# Patient Record
Sex: FEMALE | Race: ASIAN | NOT HISPANIC OR LATINO
[De-identification: names, ages, dates, MRNs, and addresses within clinical notes are randomized per-mention and may not be internally consistent; named-entity substitution may affect disease eponyms.]

---

## 2024-09-05 ENCOUNTER — APPOINTMENT (OUTPATIENT)
Dept: OTOLARYNGOLOGY | Facility: CLINIC | Age: 49
End: 2024-09-05
Payer: COMMERCIAL

## 2024-09-05 DIAGNOSIS — K21.9 GASTRO-ESOPHAGEAL REFLUX DISEASE W/OUT ESOPHAGITIS: ICD-10-CM

## 2024-09-05 DIAGNOSIS — J34.2 DEVIATED NASAL SEPTUM: ICD-10-CM

## 2024-09-05 DIAGNOSIS — H69.90 UNSPECIFIED EUSTACHIAN TUBE DISORDER, UNSPECIFIED EAR: ICD-10-CM

## 2024-09-05 PROBLEM — Z00.00 ENCOUNTER FOR PREVENTIVE HEALTH EXAMINATION: Status: ACTIVE | Noted: 2024-09-05

## 2024-09-05 PROCEDURE — 99204 OFFICE O/P NEW MOD 45 MIN: CPT | Mod: 25

## 2024-09-05 PROCEDURE — 31231 NASAL ENDOSCOPY DX: CPT

## 2024-09-05 RX ORDER — OMEPRAZOLE 20 MG/1
20 TABLET, DELAYED RELEASE ORAL
Qty: 90 | Refills: 2 | Status: ACTIVE | COMMUNITY
Start: 2024-09-05 | End: 1900-01-01

## 2024-09-05 RX ORDER — BUDESONIDE 0.5 MG/2ML
0.5 INHALANT ORAL TWICE DAILY
Qty: 3 | Refills: 2 | Status: ACTIVE | COMMUNITY
Start: 2024-09-05 | End: 1900-01-01

## 2024-09-05 NOTE — PHYSICAL EXAM
[de-identified] : retraction L TM [Nasal Endoscopy Performed] : nasal endoscopy was performed, see procedure section for findings [Normal] : teeth are normal [de-identified] : surgically absent

## 2024-09-05 NOTE — HISTORY OF PRESENT ILLNESS
[de-identified] : 48F with 1 month of clogged left ear. Associated with foggy decreased hearing. At times ear pops, but then it returns to clogged status.  Also with sinus congestion and facial pressure which comes and goes, but has been gradually worsening. Uses flonase, but not daily. Has irrigated in past, but not regularly. Phlegm in back of throat in the morning. +throat clearing. +globus. No hx of reflux. +AR.

## 2024-09-05 NOTE — HISTORY OF PRESENT ILLNESS
[de-identified] : 48F with 1 month of clogged left ear. Associated with foggy decreased hearing. At times ear pops, but then it returns to clogged status.  Also with sinus congestion and facial pressure which comes and goes, but has been gradually worsening. Uses flonase, but not daily. Has irrigated in past, but not regularly. Phlegm in back of throat in the morning. +throat clearing. +globus. No hx of reflux. +AR.

## 2024-09-05 NOTE — PHYSICAL EXAM
[de-identified] : retraction L TM [Nasal Endoscopy Performed] : nasal endoscopy was performed, see procedure section for findings [Normal] : teeth are normal [de-identified] : surgically absent

## 2024-09-05 NOTE — PROCEDURE
[FreeTextEntry6] : Procedure: Bilateral nasal endoscopy (CPT 54896)   Indication: Anterior rhinoscopy was inadequate to evaluate pathology.  Left: Septum with small spur L Moderate inferior turbinate hypertrophy, PITH ET orifice not clearly visualized, but no masses or lesions  Middle meatus clear, without masses, polyps, or pus Sphenoethmoidal recess clear, without masses, polyps, or pus  Right:  Septum midline Moderate inferior turbinate hypertrophy Middle meatus clear, without masses, polyps, or pus  Sphenoethmoidal recess clear, without masses, polyps, or pus  [de-identified] : NPL: Nasopharynx clear without masses Base of tongue without masses or lesions Vallecula clear Pyriforms clear Epiglottis crisp TVFs mobile bilaterally  Interarytenoid edema Glottic airway patent

## 2024-09-05 NOTE — PLAN
[TextEntry] : For her suspected ETD, I recommend budesonide irrigations BID. GIven the appearance of her TM, I would also like her to be evaluated by otology. It may be that she is a candidate for septum/turbs/ET balloon dilation, but I would like an opinion from otology.   For her suspected LPR, we will treat with PPI BID.   Fu 3 months.

## 2024-09-05 NOTE — REASON FOR VISIT
[Initial Evaluation] : an initial evaluation for [FreeTextEntry2] : clogged ear and sinus congestion

## 2024-09-05 NOTE — PROCEDURE
[FreeTextEntry6] : Procedure: Bilateral nasal endoscopy (CPT 20813)   Indication: Anterior rhinoscopy was inadequate to evaluate pathology.  Left: Septum with small spur L Moderate inferior turbinate hypertrophy, PITH ET orifice not clearly visualized, but no masses or lesions  Middle meatus clear, without masses, polyps, or pus Sphenoethmoidal recess clear, without masses, polyps, or pus  Right:  Septum midline Moderate inferior turbinate hypertrophy Middle meatus clear, without masses, polyps, or pus  Sphenoethmoidal recess clear, without masses, polyps, or pus  [de-identified] : NPL: Nasopharynx clear without masses Base of tongue without masses or lesions Vallecula clear Pyriforms clear Epiglottis crisp TVFs mobile bilaterally  Interarytenoid edema Glottic airway patent

## 2024-09-17 ENCOUNTER — APPOINTMENT (OUTPATIENT)
Dept: INTERNAL MEDICINE | Facility: CLINIC | Age: 49
End: 2024-09-17

## 2024-09-30 ENCOUNTER — NON-APPOINTMENT (OUTPATIENT)
Age: 49
End: 2024-09-30

## 2024-10-01 ENCOUNTER — APPOINTMENT (OUTPATIENT)
Dept: INTERNAL MEDICINE | Facility: CLINIC | Age: 49
End: 2024-10-01
Payer: COMMERCIAL

## 2024-10-01 ENCOUNTER — NON-APPOINTMENT (OUTPATIENT)
Age: 49
End: 2024-10-01

## 2024-10-01 VITALS
HEART RATE: 103 BPM | WEIGHT: 182.56 LBS | OXYGEN SATURATION: 98 % | SYSTOLIC BLOOD PRESSURE: 160 MMHG | HEIGHT: 63 IN | BODY MASS INDEX: 32.35 KG/M2 | DIASTOLIC BLOOD PRESSURE: 86 MMHG | TEMPERATURE: 98.4 F

## 2024-10-01 DIAGNOSIS — J30.9 ALLERGIC RHINITIS, UNSPECIFIED: ICD-10-CM

## 2024-10-01 DIAGNOSIS — J45.909 UNSPECIFIED ASTHMA, UNCOMPLICATED: ICD-10-CM

## 2024-10-01 DIAGNOSIS — Z13.228 ENCOUNTER FOR SCREENING FOR OTHER METABOLIC DISORDERS: ICD-10-CM

## 2024-10-01 DIAGNOSIS — I10 ESSENTIAL (PRIMARY) HYPERTENSION: ICD-10-CM

## 2024-10-01 PROCEDURE — 99386 PREV VISIT NEW AGE 40-64: CPT | Mod: 25

## 2024-10-01 PROCEDURE — 99203 OFFICE O/P NEW LOW 30 MIN: CPT | Mod: 25

## 2024-10-01 PROCEDURE — 93000 ELECTROCARDIOGRAM COMPLETE: CPT

## 2024-10-01 RX ORDER — CETIRIZINE HYDROCHLORIDE 10 MG/1
10 TABLET, FILM COATED ORAL DAILY
Qty: 30 | Refills: 2 | Status: ACTIVE | COMMUNITY
Start: 2024-10-01 | End: 1900-01-01

## 2024-10-01 RX ORDER — AMLODIPINE BESYLATE 10 MG/1
10 TABLET ORAL
Qty: 90 | Refills: 0 | Status: ACTIVE | COMMUNITY
Start: 2024-10-01 | End: 1900-01-01

## 2024-10-01 NOTE — PLAN
[FreeTextEntry1] : ETD/Nasal congestion - following with ENT, on pharmacotherapy Cough/SOB/asthma - will refer to pulmonology HTN - uncontrolled, increase amlodipine to 5 mg HMT - has appt pending for pap/mammogram, pt declines colon cancer screening

## 2024-10-01 NOTE — REVIEW OF SYSTEMS
[Vision Problems] : vision problems [Earache] : earache [Nasal Discharge] : nasal discharge [Palpitations] : palpitations [Shortness Of Breath] : shortness of breath [Cough] : cough [Fever] : no fever [Chills] : no chills [Discharge] : no discharge [Chest Pain] : no chest pain [Abdominal Pain] : no abdominal pain [Nausea] : no nausea [Vomiting] : no vomiting [Dysuria] : no dysuria [Joint Pain] : no joint pain [Muscle Pain] : no muscle pain [Itching] : no itching [Skin Rash] : no skin rash [Headache] : no headache [Memory Loss] : no memory loss [Suicidal] : not suicidal [Easy Bleeding] : no easy bleeding

## 2024-10-01 NOTE — HISTORY OF PRESENT ILLNESS
[FreeTextEntry1] : Establish care, nasal congestion, hypertension [de-identified] : 48 year old female here to establish care. Pt c/o nasal congestion for which she sees an ENT and is on flonase.  Currently she denies any chest pain, but admits to shortness of breath.  She has been to cardiology in past and has had negative stress test.

## 2024-10-01 NOTE — HISTORY OF PRESENT ILLNESS
[FreeTextEntry1] : Establish care, nasal congestion, hypertension [de-identified] : 48 year old female here to establish care. Pt c/o nasal congestion for which she sees an ENT and is on flonase.  Currently she denies any chest pain, but admits to shortness of breath.  She has been to cardiology in past and has had negative stress test.

## 2024-10-01 NOTE — HEALTH RISK ASSESSMENT
[Yes] : Yes [Monthly or less (1 pt)] : Monthly or less (1 point) [1 or 2 (0 pts)] : 1 or 2 (0 points) [Never (0 pts)] : Never (0 points) [No falls in past year] : Patient reported no falls in the past year [0] : 2) Feeling down, depressed, or hopeless: Not at all (0) [Never] : Never [NO] : No [Patient reported mammogram was normal] : Patient reported mammogram was normal [Patient reported PAP Smear was normal] : Patient reported PAP Smear was normal [Patient declined colonoscopy] : Patient declined colonoscopy [With Family] : lives with family [Employed] : employed [] :  [# Of Children ___] : has [unfilled] children [de-identified] : sylvain

## 2024-10-01 NOTE — HEALTH RISK ASSESSMENT
[Yes] : Yes [Monthly or less (1 pt)] : Monthly or less (1 point) [1 or 2 (0 pts)] : 1 or 2 (0 points) [Never (0 pts)] : Never (0 points) [No falls in past year] : Patient reported no falls in the past year [0] : 2) Feeling down, depressed, or hopeless: Not at all (0) [Never] : Never [NO] : No [Patient reported mammogram was normal] : Patient reported mammogram was normal [Patient reported PAP Smear was normal] : Patient reported PAP Smear was normal [Patient declined colonoscopy] : Patient declined colonoscopy [With Family] : lives with family [Employed] : employed [] :  [# Of Children ___] : has [unfilled] children [de-identified] : sylvain

## 2024-12-12 ENCOUNTER — APPOINTMENT (OUTPATIENT)
Dept: OTOLARYNGOLOGY | Facility: CLINIC | Age: 49
End: 2024-12-12